# Patient Record
Sex: MALE | URBAN - METROPOLITAN AREA
[De-identification: names, ages, dates, MRNs, and addresses within clinical notes are randomized per-mention and may not be internally consistent; named-entity substitution may affect disease eponyms.]

---
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
## 2020-12-15 NOTE — ED ADULT NURSE NOTE - THOUGHTS OF HOMICIDE/VIOLENCE TOWARDS OTHERS YN, MLM
Last Office Visit   10/18/2020  Upcoming: Visit date not found    Last Refill - #1.12 mL on 6/30/20 with 5 refills    Please Advise   No

## 2021-04-20 PROBLEM — Z00.00 ENCOUNTER FOR PREVENTIVE HEALTH EXAMINATION: Status: ACTIVE | Noted: 2021-04-20

## 2021-04-21 ENCOUNTER — APPOINTMENT (OUTPATIENT)
Dept: UROLOGY | Facility: CLINIC | Age: 21
End: 2021-04-21
Payer: MEDICAID

## 2021-04-21 VITALS
HEIGHT: 69 IN | DIASTOLIC BLOOD PRESSURE: 65 MMHG | HEART RATE: 95 BPM | BODY MASS INDEX: 18.51 KG/M2 | WEIGHT: 125 LBS | SYSTOLIC BLOOD PRESSURE: 112 MMHG | OXYGEN SATURATION: 97 %

## 2021-04-21 DIAGNOSIS — N41.0 ACUTE PROSTATITIS: ICD-10-CM

## 2021-04-21 PROCEDURE — 99072 ADDL SUPL MATRL&STAF TM PHE: CPT

## 2021-04-21 PROCEDURE — 99203 OFFICE O/P NEW LOW 30 MIN: CPT

## 2021-04-21 RX ORDER — SULFAMETHOXAZOLE AND TRIMETHOPRIM 800; 160 MG/1; MG/1
800-160 TABLET ORAL TWICE DAILY
Qty: 28 | Refills: 0 | Status: ACTIVE | COMMUNITY
Start: 2021-04-21 | End: 1900-01-01

## 2021-04-21 NOTE — HISTORY OF PRESENT ILLNESS
[FreeTextEntry1] : 20 yo male presents for dysuria. \par Since February has been having dysuria. Started after unprotected sex. \par Has been tested: negative and presumptively treated with Azithromycin and Doxycycline. \par Improved after Doxycycline and then had recurrence. \par 1st urination in the morning is worst, improves later in the day but has discomfort all the time. \par Trying well hydrate and it helps. \par Also has noticed off and on discharge from penis. \par On Valcyclovir prophylaxis. \par Has history of Chlamydia in the past. \par Denieshematuria, lower abdominal or flank pain, nausea, vomiting, fever, chills or rigors. \par

## 2021-04-21 NOTE — PHYSICAL EXAM
[Normal Appearance] : normal appearance [General Appearance - In No Acute Distress] : no acute distress [] : no respiratory distress [Abdomen Soft] : soft [Abdomen Tenderness] : non-tender [Costovertebral Angle Tenderness] : no ~M costovertebral angle tenderness [Urethral Meatus] : meatus normal [Penis Abnormality] : normal circumcised penis [Scrotum] : the scrotum was normal [Epididymis] : the epididymides were normal [Testes Tenderness] : no tenderness of the testes [Testes Mass (___cm)] : there were no testicular masses [FreeTextEntry1] : ALPA: refused [Normal Station and Gait] : the gait and station were normal for the patient's age [Skin Color & Pigmentation] : normal skin color and pigmentation [No Focal Deficits] : no focal deficits [Oriented To Time, Place, And Person] : oriented to person, place, and time

## 2021-04-21 NOTE — ASSESSMENT
[FreeTextEntry1] : Prostatitis:\par Will get Urinalysis and Urine culture. \par Bactrim DS x 2 weeks. \par Abstain from sex for 2 weeks. \par \par Return to office in 4 weeks or sooner if any issues.

## 2021-04-22 LAB
APPEARANCE: CLEAR
BACTERIA: NEGATIVE
BILIRUBIN URINE: NEGATIVE
BLOOD URINE: NEGATIVE
COLOR: NORMAL
GLUCOSE QUALITATIVE U: NEGATIVE
HYALINE CASTS: 2 /LPF
KETONES URINE: NORMAL
LEUKOCYTE ESTERASE URINE: NEGATIVE
MICROSCOPIC-UA: NORMAL
NITRITE URINE: NEGATIVE
PH URINE: 6
PROTEIN URINE: NEGATIVE
RED BLOOD CELLS URINE: 1 /HPF
SPECIFIC GRAVITY URINE: 1.02
SQUAMOUS EPITHELIAL CELLS: 1 /HPF
UROBILINOGEN URINE: NORMAL
WHITE BLOOD CELLS URINE: 60 /HPF

## 2021-04-23 LAB — BACTERIA UR CULT: NORMAL

## 2021-05-21 ENCOUNTER — APPOINTMENT (OUTPATIENT)
Dept: UROLOGY | Facility: CLINIC | Age: 21
End: 2021-05-21

## 2021-09-09 ENCOUNTER — TRANSCRIPTION ENCOUNTER (OUTPATIENT)
Age: 21
End: 2021-09-09

## 2021-10-29 NOTE — ED ADULT NURSE NOTE - CARDIO WDL
Brief Postoperative Note    Patient: Kai Walker  YOB: 1967  MRN: [id number]    Date of Procedure: 10/29/2021     Pre-Op Diagnosis: COSMETIC    Post-Op Diagnosis: Same as preoperative diagnosis.       Procedure(s):  RIGHT BREAST CAPSULECTOMY, BILATERAL MASTOPEXY    Surgeon(s):  Judie Price MD    Surgical Assistant: Surg Asst-1: Susan PINEDO    Anesthesia: General     Estimated Blood Loss (mL): less than 50     Complications: None    Specimens:   ID Type Source Tests Collected by Time Destination   1 : RIGHT BREAST CAPSULE Fresh Breast  Judie Price MD 10/29/2021 0807 Pathology        Implants: * No implants in log *    Drains: * No LDAs found *    Findings: normal for age and history    Electronically Signed by Kerry Kawasaki, MD on 10/29/2021 at 10:27 AM Normal rate, regular rhythm, normal S1, S2 heart sounds heard.